# Patient Record
(demographics unavailable — no encounter records)

---

## 2025-07-09 NOTE — HISTORY OF PRESENT ILLNESS
[FreeTextEntry1] : IBS [de-identified] : stable IBS symps-- using miralax and req prn levsin reviewed labs from 2024 [facility] pediatric nurse